# Patient Record
(demographics unavailable — no encounter records)

---

## 2017-01-24 NOTE — ER DOCUMENT REPORT
HPI





- HPI


Onset: Other - a couple days


Quality of pain: No pain


Pain Level: 1


Context: 


Patient presents to the emergency department with complaints of runny nose and 

request for a work note.  She reports she called out work at [employer] 

and needs a note.  She denies fever vomiting diarrhea.  She reports her nose is 

stuffy at night and she doesn't think she should be sneezing on people at work.


Associated Symptoms: None.  denies: Fever


Exacerbated by: Denies


Relieved by: Denies


Similar symptoms previously: No


Recently seen / treated by doctor: No





- REPRODUCTIVE


Reproductive: DENIES: Pregnant:





- DERM


Skin Color: Normal





Past Medical History





- General


Information source: Patient


Last Menstrual Period: 1/11/17





- Social History


Smoking Status: Current Every Day Smoker


Cigarette use (# per day): Yes


Chew tobacco use (# tins/day): No


Frequency of alcohol use: Social


Drug Abuse: None


Occupation: [employer]


Family History: Hyperlipidemia, Hypertension, Malignancy


Patient has suicidal ideation: No


Patient has homicidal ideation: No





- Past Medical History


Cardiac Medical History: 


   Denies: Hx Coronary Artery Disease, Hx Heart Attack, Hx Hypertension


Pulmonary Medical History: 


   Denies: Hx Asthma, Hx Bronchitis, Hx COPD


Neurological Medical History: Reports: Hx Migraine.  Denies: Hx Cerebrovascular 

Accident, Hx Seizures


Renal/ Medical History: Denies: Hx Peritoneal Dialysis


Musculoskeltal Medical History: Denies Hx Arthritis, Reports Hx Musculoskeletal 

Trauma


Skin Medical History: Reports Hx Cellulitis, Reports Hx MRSA


Traumatic Medical History: Reports: Hx Fractures


Past Surgical History: Reports: Hx Breast Surgery - augmentation, Hx Orthopedic 

Surgery - left arm.  Denies: Hx Pacemaker





- Immunizations


Immunizations up to date: Yes


Hx Diphtheria, Pertussis, Tetanus Vaccination: Yes





Vertical Provider Document





- CONSTITUTIONAL


Agree With Documented VS: Yes


Exam Limitations: No Limitations


General Appearance: WD/WN, No Apparent Distress





- INFECTION CONTROL


TRAVEL OUTSIDE OF THE [place] IN LAST 30 DAYS: No





- HEENT


HEENT: Atraumatic, Normocephalic.  negative: Pharyngeal Exudate, Pharyngeal 

Erythema, Tympanic Membrane Red





- NECK


Neck: Normal Inspection, Supple.  negative: Lymphadenopathy-Left, 

Lymphadenopathy-Right





- RESPIRATORY


Respiratory: Breath Sounds Normal, No Respiratory Distress.  negative: Rhonchi, 

Wheezing


O2 Sat by Pulse Oximetry: 95





- CARDIOVASCULAR


Cardiovascular: Regular Rate, Regular Rhythm





- GI/ABDOMEN


Gastrointestinal: Abdomen Soft





- MUSCULOSKELETAL/EXTREMETIES


Musculoskeletal/Extremeties: MAEW, FROM





- NEURO


Level of Consciousness: Awake, Alert, Appropriate


Motor/Sensory: No Motor Deficit





- DERM


Integumentary: Warm, Dry





Course





- Re-evaluation


Re-evalutation: 





01/24/17 14:54


Pt looks good, nontoxic looking RR even/unlabored,





- Vital Signs


Vital signs: 


 











Temp Pulse Resp BP Pulse Ox


 


 97.5 F   85   18   122/73   95 


 


 01/24/17 14:26  01/24/17 14:26  01/24/17 14:26  01/24/17 14:26  01/24/17 14:26














Discharge





- Discharge


Clinical Impression: 


 Rhinorrhea





Condition: Stable


Disposition: HOME, SELF-CARE


Additional Instructions: 


*You have been evaluated for rhinorrhea, work note


*Increase fluid intake 


*Monitor your blood pressure. Your blood pressure was elevated today.  This may 

be because you were anxious, in pain or because you need medication.  It is 

important to follow up with your primary care provider for full evaluation.


*Monitor your temperature, take Tylenol as indicated


*Follow up with a primary care provider within one week for recheck


*Return to ED for worsening condition, changes, needs


Forms:  Elevated Blood Pressure, Return to Work

## 2017-01-24 NOTE — ER DOCUMENT REPORT
ED Medical Screen (RME)





- General


Chief Complaint: Cold Symptoms


Stated Complaint: COUGH


Time seen by provider: 14:26


Mode of Arrival: Ambulatory


Information source: Patient


Notes: 


26-year-old female presents to ED for cough cold congestion runny nose.  States 

she cannot call in without a doctor's note and she has no insurance.  Last 

menstrual period 01/11/2017











I have greeted and performed a rapid initial assessment of this patient.  A 

comprehensive ED assessment and evaluation of the patient, analysis of test 

results and completion of medical decision making process will be conducted by 

an additional ED providers.


TRAVEL OUTSIDE OF THE [place] IN LAST 30 DAYS: No





- Related Data


Allergies/Adverse Reactions: 


 





sulfamethoxazole [From Bactrim] Allergy (Verified 05/30/15 14:53)


 


trimethoprim [From Bactrim] Allergy (Verified 05/30/15 14:53)


 











Past Medical History





- Past Medical History


Cardiac Medical History: 


   Denies: Hx Coronary Artery Disease, Hx Heart Attack, Hx Hypertension


Pulmonary Medical History: 


   Denies: Hx Asthma, Hx Bronchitis, Hx COPD


Neurological Medical History: Reports: Hx Migraine.  Denies: Hx Cerebrovascular 

Accident, Hx Seizures


Musculoskeltal Medical History: Denies Hx Arthritis, Reports Hx Musculoskeletal 

Trauma


Skin Medical History: Reports Hx Cellulitis, Reports Hx MRSA


Traumatic Medical History: Reports: Hx Fractures


Past Surgical History: Reports: Hx Breast Surgery - augmentation, Hx Orthopedic 

Surgery - left arm.  Denies: Hx Pacemaker





- Immunizations


Immunizations up to date: Yes


Hx Diphtheria, Pertussis, Tetanus Vaccination: Yes